# Patient Record
Sex: FEMALE | Race: WHITE | Employment: PART TIME | URBAN - METROPOLITAN AREA
[De-identification: names, ages, dates, MRNs, and addresses within clinical notes are randomized per-mention and may not be internally consistent; named-entity substitution may affect disease eponyms.]

---
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
## 2023-11-22 ENCOUNTER — TRANSCRIBE ORDERS (OUTPATIENT)
Facility: HOSPITAL | Age: 18
End: 2023-11-22

## 2023-11-22 ENCOUNTER — HOSPITAL ENCOUNTER (OUTPATIENT)
Facility: HOSPITAL | Age: 18
Discharge: HOME | End: 2023-11-25

## 2023-11-22 DIAGNOSIS — R55 SYNCOPE AND COLLAPSE: Primary | ICD-10-CM

## 2023-11-22 DIAGNOSIS — R55 SYNCOPE AND COLLAPSE: ICD-10-CM

## 2023-11-22 LAB
EKG ATRIAL RATE: 55 BPM
EKG DIAGNOSIS: NORMAL
EKG P AXIS: 58 DEGREES
EKG P-R INTERVAL: 98 MS
EKG Q-T INTERVAL: 438 MS
EKG QRS DURATION: 92 MS
EKG QTC CALCULATION (BAZETT): 419 MS
EKG R AXIS: 57 DEGREES
EKG T AXIS: 37 DEGREES
EKG VENTRICULAR RATE: 55 BPM

## 2024-10-31 ENCOUNTER — OFFICE VISIT (OUTPATIENT)
Age: 19
End: 2024-10-31
Payer: COMMERCIAL

## 2024-10-31 VITALS
WEIGHT: 119.2 LBS | HEIGHT: 67 IN | SYSTOLIC BLOOD PRESSURE: 122 MMHG | HEART RATE: 97 BPM | BODY MASS INDEX: 18.71 KG/M2 | DIASTOLIC BLOOD PRESSURE: 80 MMHG | RESPIRATION RATE: 18 BRPM

## 2024-10-31 DIAGNOSIS — N92.6 IRREGULAR MENSES: Primary | ICD-10-CM

## 2024-10-31 LAB
CONTROL: NEGATIVE
PREGNANCY TEST URINE, POC: NORMAL

## 2024-10-31 PROCEDURE — 99459 PELVIC EXAMINATION: CPT | Performed by: OBSTETRICS & GYNECOLOGY

## 2024-10-31 PROCEDURE — 81025 URINE PREGNANCY TEST: CPT | Performed by: OBSTETRICS & GYNECOLOGY

## 2024-10-31 PROCEDURE — 99213 OFFICE O/P EST LOW 20 MIN: CPT | Performed by: OBSTETRICS & GYNECOLOGY

## 2024-10-31 RX ORDER — DOXYCYCLINE HYCLATE 100 MG
100 TABLET ORAL 2 TIMES DAILY
Qty: 14 TABLET | Refills: 0 | Status: SHIPPED | OUTPATIENT
Start: 2024-10-31 | End: 2024-11-07

## 2024-10-31 NOTE — PROGRESS NOTES
Zakia Golden is a 19 y.o. female presents for a problem visit.    Chief Complaint   Patient presents with    Vaginal Bleeding     No LMP recorded.  Birth Control: IUD kyleena .  Last Pap: does not meet criteria    The patient is reporting having: Abnormal Bleeding/irregular cycles/ bleeding everyday for 2 months.Last normal cycle in August. Has family history of abnormal cycles  She reports the symptoms are is unchanged.  Aggravating factors include N/A.  And alleviating factors include N/A.  1. Have you been to the ER, urgent care clinic, or hospitalized since your last visit? No    2. Have you seen or consulted any other health care providers outside of the [facility] since your last visit?no    Examination chaperoned by Ana Maria Amaya LPN.  
given to pt.  Handouts given to pt.

## 2024-11-01 NOTE — PROGRESS NOTES
Zakia Golden is a 19 y.o. female returns for an annual exam     Chief Complaint   Patient presents with    Ultrasound    Annual Exam     Patient's last menstrual period was 11/09/2024 (approximate).  Her periods are light in flow and often irregular with no apparent pattern.  She does not have dysmenorrhea.  Problems: bleeding  Birth Control: IUD 7/20/22 Kyleena.  Last Pap: does not meet criteria  She does not have a history of MARY 2, 3 or cervical cancer.   With regard to the Gardisil vaccine, she has received all 3 injections      1. Have you been to the ER, urgent care clinic, or hospitalized since your last visit? No    2. Have you seen or consulted any other health care providers outside of the [facility] since your last visit? No    Examination chaperoned by Ana Maria Amaya LPN.

## 2024-11-04 LAB
C TRACH RRNA SPEC QL NAA+PROBE: NEGATIVE
N GONORRHOEA RRNA SPEC QL NAA+PROBE: NEGATIVE
T VAGINALIS RRNA SPEC QL NAA+PROBE: NEGATIVE

## 2024-11-15 ENCOUNTER — OFFICE VISIT (OUTPATIENT)
Age: 19
End: 2024-11-15

## 2024-11-15 VITALS
DIASTOLIC BLOOD PRESSURE: 79 MMHG | HEART RATE: 79 BPM | SYSTOLIC BLOOD PRESSURE: 112 MMHG | WEIGHT: 121.4 LBS | BODY MASS INDEX: 19.01 KG/M2

## 2024-11-15 DIAGNOSIS — N92.1 BREAKTHROUGH BLEEDING WITH IUD: ICD-10-CM

## 2024-11-15 DIAGNOSIS — Z01.419 WELL WOMAN EXAM: Primary | ICD-10-CM

## 2024-11-15 DIAGNOSIS — Z11.3 SCREENING FOR VENEREAL DISEASE: ICD-10-CM

## 2024-11-15 DIAGNOSIS — N92.6 IRREGULAR MENSES: Primary | ICD-10-CM

## 2024-11-15 DIAGNOSIS — Z97.5 BREAKTHROUGH BLEEDING WITH IUD: ICD-10-CM

## 2024-11-15 RX ORDER — ESTRADIOL 2 MG/1
2 TABLET ORAL DAILY
Qty: 90 TABLET | Refills: 4 | Status: SHIPPED | OUTPATIENT
Start: 2024-11-15

## 2024-11-15 ASSESSMENT — PATIENT HEALTH QUESTIONNAIRE - PHQ9
1. LITTLE INTEREST OR PLEASURE IN DOING THINGS: NOT AT ALL
SUM OF ALL RESPONSES TO PHQ QUESTIONS 1-9: 0
SUM OF ALL RESPONSES TO PHQ QUESTIONS 1-9: 0
SUM OF ALL RESPONSES TO PHQ9 QUESTIONS 1 & 2: 0
SUM OF ALL RESPONSES TO PHQ QUESTIONS 1-9: 0
2. FEELING DOWN, DEPRESSED OR HOPELESS: NOT AT ALL
SUM OF ALL RESPONSES TO PHQ QUESTIONS 1-9: 0

## 2024-11-15 NOTE — PROGRESS NOTES
Zakia Golden is a No obstetric history on file.,  19 y.o. female White (non-) whose Patient's last menstrual period was 11/09/2024 (approximate).  was on 11/9/2024 who presents for her annual checkup. She is having problems - continued spotting irregularly with IUD - took Doxy no relief.      Menstrual status:    Her periods are light in flow, regular    She denies dysmenorrhea.        Contraception:    The current method of family planning is IUD Kyleena 7/2022    Sexual history:    She  reports being sexually active and has had partner(s) who are male. She reports using the following method of birth control/protection: I.U.D..      Pap and Mammogram History:    Last Pap: does not meet criteria  She does not have a history of MARY 2, 3 or cervical cancer.   With regard to the Gardisil vaccine, she has received all 3 injections       The patient does not have a family history of breast cancer.  History reviewed. No pertinent family history.    Past Medical History:   Diagnosis Date    Urine pregnancy test negative 07/20/2022    negative     History reviewed. No pertinent surgical history.    No current outpatient medications on file.     No current facility-administered medications for this visit.     Allergies: Patient has no known allergies.   Tobacco History:  reports that she has never smoked. She has never used smokeless tobacco.  Alcohol Abuse:  reports current alcohol use of about 4.0 standard drinks of alcohol per week.  Drug Abuse:  reports no history of drug use.    There is no problem list on file for this patient.        Physical Exam    /79   Pulse 79   Wt 55.1 kg (121 lb 6.4 oz)   LMP 11/09/2024 (Approximate)   BMI 19.01 kg/m²     Constitutional  Appearance: well-nourished, well developed, alert, in no acute distress    HENT  Head and Face: appears normal    Neck  Inspection/Palpation: normal appearance, no masses or tenderness  Lymph Nodes: no lymphadenopathy present  Thyroid: gland

## 2024-11-22 LAB
., LABCORP: NORMAL
C TRACH RRNA CVX QL NAA+PROBE: NEGATIVE
CYTOLOGIST CVX/VAG CYTO: NORMAL
CYTOLOGY CVX/VAG DOC CYTO: NORMAL
CYTOLOGY CVX/VAG DOC THIN PREP: NORMAL
DX ICD CODE: NORMAL
Lab: NORMAL
N GONORRHOEA RRNA CVX QL NAA+PROBE: NEGATIVE
OTHER STN SPEC: NORMAL
STAT OF ADQ CVX/VAG CYTO-IMP: NORMAL
T VAGINALIS RRNA SPEC QL NAA+PROBE: NEGATIVE